# Patient Record
Sex: FEMALE | Race: WHITE | NOT HISPANIC OR LATINO | Employment: UNEMPLOYED | URBAN - METROPOLITAN AREA
[De-identification: names, ages, dates, MRNs, and addresses within clinical notes are randomized per-mention and may not be internally consistent; named-entity substitution may affect disease eponyms.]

---

## 2017-04-03 ENCOUNTER — LAB (OUTPATIENT)
Dept: LAB | Facility: HOSPITAL | Age: 37
End: 2017-04-03

## 2017-04-03 ENCOUNTER — CLINICAL SUPPORT (OUTPATIENT)
Dept: GENETICS | Facility: HOSPITAL | Age: 37
End: 2017-04-03

## 2017-04-03 DIAGNOSIS — Z80.41 FAMILY HISTORY OF OVARIAN CANCER: ICD-10-CM

## 2017-04-03 DIAGNOSIS — Z80.3 FAMILY HISTORY OF BREAST CANCER: Primary | ICD-10-CM

## 2017-04-03 PROCEDURE — 96040: CPT | Performed by: GENETIC COUNSELOR, MS

## 2017-04-04 NOTE — PROGRESS NOTES
Zakiya Borrego is a 36-year old female who was seen for genetic counseling due to a family history of breast and ovarian cancer and a BRCA1 mutation identified in her sister.  Ms. Borrego has a history of melanoma at age 18.  Ms. Borrego has already had negative single site testing for the BRCA1 mutation identified in her sister. Ms. Borrego was interested in discussing her risk for other hereditary cancer syndromes and appropriate screening recommendations, and decided to pursue genetic testing.   Ms. Borrego opted to pursue comprehensive testing via the CancerNext panel ordered through [employer] which includes 34 genes that are known to increase the risk of cancer.  Typically, only single site testing for the known mutation is recommended for unaffected individuals in a family once a mutation has been identified, but in this case no affected relatives have been tested.  Therefore, due to the possibility of additional genetic risk factors that could have contributed to the family history, comprehensive testing was ordered.      PERTINENT FAMILY HISTORY: (See attached pedigree)   Pat. Aunt:  Breast cancer, 39  Pat. Cousin:  Breast cancer, late 30s  Pat. Grandmother: Breast cancer, 36     Ovarian cancer, 45    No affected relatives have had genetic testing.  We do not have medical records regarding Ms. Borrego’s family members’ diagnoses.  A copy of her sister’s genetic testing was available for review.    RISK ASSESSMENT:  Ms. Borrego’s family history of cancer raises the question of a hereditary cancer syndrome. Her sister, who has not had a cancer diagnosis, has been identified to have a BRCA1 mutation.   Ms. Borrego has already had negative testing for that specific mutation.  In this particular family, no affected relatives have had genetic testing, and in some cases there may be more than one genetic mutation present in a family that is contributing to the cancer risk.  We discussed full BRCA1/2 testing  as well as multigene panel testing based on the family history of breast and ovarian cancer.  This risk assessment is based on the family history information provided at the time of the appointment.  The assessment could change in the future should new information be obtained.    GENETIC COUNSELING (40 minutes): We reviewed the family history information in detail.  Cases of cancer follow three general patterns: sporadic, familial, and hereditary.  While most cancer is sporadic, some cases appear to occur in family clusters.  These cases are said to be familial and account for 10-20% of breast cancer   cases.  Familial cases may be due to a combination of shared genes and environmental factors among family members.  In even fewer families, the cancer is said to be inherited, and the genes responsible for the cancer are known.      Family histories typical of hereditary cancer syndromes usually include multiple first- and second-degree relatives diagnosed with cancer types that define a syndrome.  These cases tend to be diagnosed at younger-than-expected ages and can be bilateral or multifocal.  The cancer in these families follows an autosomal dominant inheritance pattern, which indicates the likely presence of a mutation in a cancer susceptibility gene.  Children and siblings of an individual believed to carry this mutation have a 50% chance of inheriting that mutation, thereby inheriting the increased risk to develop cancer.  These mutations can be passed down from the maternal or the paternal lineage.    Based on Ms. Borrego’s family history of breast and ovarian cancer, we discussed hereditary breast and ovarian cancer syndrome.  A significant proportion of hereditary breast and ovarian cancer can be attributed to mutations in the BRCA1 and BRCA2 genes.  Mutations in these genes confer an increased risk for breast cancer, ovarian cancer, male breast cancer, prostate cancer, and pancreatic cancer.  Women with a  BRCA1 or BRCA2 mutation have up to an 87% lifetime risk of breast cancer and up to a 44% risk of ovarian cancer.      There are other, more rare, hereditary cancer syndromes. Some of these conditions have well defined cancer risks and established management guidelines.  Other genes that can be tested for have been more recently described, and there may be less data regarding the risks and therefore may not have established management guidelines.  We discussed these limitations at length.  Based on Ms. Borrego’s family history and her desire to get the most information regarding her personal risks and potential risks for family members, she opted to pursue testing through a panel evaluating several other genes known to increase the risk for cancer.    Genetic Testing:  The risks, benefits and limitations of genetic testing and implications for clinical management following testing were reviewed.  DNA test results can influence decisions regarding screening, prevention and surgical management.  Genetic testing can have significant psychological implications for both individuals and families.  Also discussed was the possibility of employment and insurance discrimination based on genetic test results and the laws in place to prevent this.    We discussed panel testing, which would involve testing for BRCA1/2 and 32 additional cancer susceptibility genes included on the CancerNext panel simultaneously.  The benefits and limitations of genetic testing were discussed and Ms. Borrego decided to pursue testing via the panel. The implications of a positive or negative test result were discussed. We discussed the possibility that, in some cases, genetic test results may be informative or may be ambiguous due to the identification of a genetic variant. These variants may or may not be associated with an increased cancer risk.  Given her family history, a negative test result may not eliminate all cancer risk, although the risk  would not be as high as it would with positive genetic testing.      PLAN: Ms. Borrego will be contacted by telephone to discuss the results, which should be available in 2-3 weeks. In the meantime Ms. Borrego is welcome to contact us at [phone] with any questions she may have.      Elizabeth Sanchez MS, [facility] [place]       Certified Genetic Counselor

## 2017-04-24 ENCOUNTER — DOCUMENTATION (OUTPATIENT)
Dept: GENETICS | Facility: HOSPITAL | Age: 37
End: 2017-04-24

## 2017-04-24 NOTE — PROGRESS NOTES
Zakiya Borrego is a 36-year old female who was seen for genetic counseling due to a family history of breast and ovarian cancer and a BRCA1 mutation identified in her sister.  Ms. Borrego has a history of melanoma at age 18.  Ms. Borrego has already had negative single site testing for the BRCA1 mutation identified in her sister. Ms. Borrego was interested in discussing her risk for other hereditary cancer syndromes and appropriate screening recommendations, and decided to pursue genetic testing.   Ms. Borrego opted to pursue comprehensive testing via the CancerNext panel ordered through [employer] which includes 34 genes that are known to increase the risk of cancer.  Typically, only single site testing for the known mutation is recommended for unaffected individuals in a family once a mutation has been identified, but in this case no affected relatives have been tested.  Therefore, due to the possibility of additional genetic risk factors that could have contributed to the family history, comprehensive testing was ordered.  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  No mutations were identified in any of the genes known to be associated with hereditary breast cancer.  These results were discussed with the patient by telephone on 4/24/17.    PERTINENT FAMILY HISTORY: (See attached pedigree)   Pat. Aunt:  Breast cancer, 39  Pat. Cousin:  Breast cancer, late 30s  Pat. Grandmother: Breast cancer, 36     Ovarian cancer, 45    No affected relatives have had genetic testing.  We do not have medical records regarding Ms. Borrego’s family members’ diagnoses.  A copy of her sister’s genetic testing was available for review.    RISK ASSESSMENT:  Ms. Borrego’s family history of cancer raises the question of a hereditary cancer syndrome. Her sister, who has not had a cancer diagnosis, has been identified to  have a BRCA1 mutation.   Ms. Borrego has already had negative testing for that specific mutation.  In this particular family, no affected relatives have had genetic testing, and in some cases there may be more than one genetic mutation present in a family that is contributing to the cancer risk.  We discussed full BRCA1/2 testing as well as multigene panel testing based on the family history of breast and ovarian cancer.     The identified mutation in HOXB13 is not known to impact breast cancer risk.  Therefore, Ms. Borrego’s breast cancer risk for breast cancer should be assessed based on the family history, taking into account that her sister has had a BRCA1 mutation identified that Ms. Borrego has tested negative for.  Utilizing the Tyrer-Cuzick/SHIRA model, Ms. Borrego’s lifetime breast cancer risk was estimated to be 24.1%, which is elevated above the general population risk for breast cancer of 12.5%.   In general, a risk greater than 20% warrants increased screening.  This risk assessment is based on the family history information provided at the time of the appointment.  The assessment could change in the future should new information be obtained.    GENETIC COUNSELING (40 minutes): We reviewed the family history information in detail.  Cases of cancer follow three general patterns: sporadic, familial, and hereditary.  While most cancer is sporadic, some cases appear to occur in family clusters.  These cases are said to be familial and account for 10-20% of breast cancer cases.  Familial cases may be due to a combination of shared genes and environmental factors among family members.  In even fewer families, the cancer is said to be inherited, and the genes responsible for the cancer are known.      Family histories typical of hereditary cancer syndromes usually include multiple first- and second-degree relatives diagnosed with cancer types that define a syndrome.  These cases tend to be diagnosed at  younger-than-expected ages and can be bilateral or multifocal.  The cancer in these families follows an autosomal dominant inheritance pattern, which indicates the likely presence of a mutation in a cancer susceptibility gene.  Children and siblings of an individual believed to carry this mutation have a 50% chance of inheriting that mutation, thereby inheriting the increased risk to develop cancer.  These mutations can be passed down from the maternal or the paternal lineage.    Based on Ms. Borrego’s family history of breast and ovarian cancer, we discussed hereditary breast and ovarian cancer syndrome.  A significant proportion of hereditary breast and ovarian cancer can be attributed to mutations in the BRCA1 and BRCA2 genes.  Mutations in these genes confer an increased risk for breast cancer, ovarian cancer, male breast cancer, prostate cancer, and pancreatic cancer.  Women with a BRCA1 or BRCA2 mutation have up to an 87% lifetime risk of breast cancer and up to a 44% risk of ovarian cancer.      There are other, more rare, hereditary cancer syndromes. Some of these conditions have well defined cancer risks and established management guidelines.  Other genes that can be tested for have been more recently described, and there may be less data regarding the risks and therefore may not have established management guidelines.  We discussed these limitations at length.  Based on Ms. Borrego’s family history and her desire to get the most information regarding her personal risks and potential risks for family members, she opted to pursue testing through a panel evaluating several other genes known to increase the risk for cancer.    Genetic Testing:  The risks, benefits and limitations of genetic testing and implications for clinical management following testing were reviewed.  DNA test results can influence decisions regarding screening, prevention and surgical management.  Genetic testing can have significant  psychological implications for both individuals and families.  Also discussed was the possibility of employment and insurance discrimination based on genetic test results and the laws in place to prevent this.    We discussed panel testing, which would involve testing for BRCA1/2 and 32 additional cancer susceptibility genes included on the CancerNext panel simultaneously.  The benefits and limitations of genetic testing were discussed and Ms. Borrego decided to pursue testing via the panel. The implications of a positive or negative test result were discussed. We discussed the possibility that, in some cases, genetic test results may be informative or may be ambiguous due to the identification of a genetic variant. These variants may or may not be associated with an increased cancer risk.  Given her family history, a negative test result may not eliminate all cancer risk, although the risk would not be as high as it would with positive genetic testing.      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  This gene has been specifically associated with an increased risk of prostate cancer and therefore this result may impact risks and management for Ms. Borrego’s male relatives.  At this time, HOXB13 has not been associated with cancer risks that would impact Ms. Borrego or other female relatives.  However, this is a gene that has been recently added to comprehensive multigene panels, and it is possible that with time additional risk information may become available that would be important to Ms. Borrego.  She is encouraged to contact us annually to learn if additional information has become available regarding the HOXB13 gene.  Relatives could now opt to have single site testing for this identified HOXB13 mutation to determine if they have inherited this prostate cancer risk factor.  If a male is found to test positive, earlier prostate screening would be appropriate.      Cancer Prevention:  Despite the negative BRCA1/2 and additional breast cancer susceptibility gene testing, Ms. Borrego is still at a somewhat increased lifetime risk for breast cancer.  Accounting for the fact that she has tested negative for the BRCA1 mutation identified in her sister, Ms. Borrego’s lifetime risk was still estimate to be up to 24.1%.  Given the increased risk, options available to individuals with a high lifetime risk for breast cancer were discussed.      Surveillance for individuals with a high lifetime risk of breast cancer (>20% lifetime risk), based on NCCN guidelines, would consist of semi-annual clinical breast exams and monthly self-breast exams starting by age 18 and annual mammography starting 10 years younger than the earliest diagnosis in the family.  According to an American Cancer Society expert panel, annual breast MRI should be offered to women whose lifetime risk of breast cancer is 20-25 percent or more.  Female relatives could have individualized risk assessments performed to quantify their breast cancer risk.  Breast cancer chemoprevention is another option that female relatives may wish consider.  Studies have shown that Tamoxifen and Raloxifene can cut the risk of estrogen receptor positive breast cancer by 50% when taken by high-risk women over a 5-year period.  There are risks and side effects associated with these medications; therefore the risks versus benefits must be considered prior to deciding to take chemopreventative medications.       PLAN:  Ms. Borrego would like to be followed by the [facility] at [facility] to coordinate increased screening based on her increased breast cancer risk.  The clinic coordinator will contact Ms. Borrego for an appointment.  Ms. Borrego is welcome to contact us at [phone] with any questions she may have.      Elizabeth Sanchez MS, [facility] [place]       Certified Genetic Counselor          Cc: Cesar MORILLO  MD Zakiya Aceves

## 2017-07-26 ENCOUNTER — OFFICE VISIT (OUTPATIENT)
Dept: GYNECOLOGIC ONCOLOGY | Facility: CLINIC | Age: 37
End: 2017-07-26

## 2017-07-26 VITALS
BODY MASS INDEX: 20.83 KG/M2 | TEMPERATURE: 98.4 F | HEART RATE: 75 BPM | WEIGHT: 125 LBS | SYSTOLIC BLOOD PRESSURE: 114 MMHG | HEIGHT: 65 IN | RESPIRATION RATE: 16 BRPM | DIASTOLIC BLOOD PRESSURE: 66 MMHG | OXYGEN SATURATION: 97 %

## 2017-07-26 DIAGNOSIS — Z12.31 ENCOUNTER FOR MAMMOGRAM TO ESTABLISH BASELINE MAMMOGRAM: ICD-10-CM

## 2017-07-26 DIAGNOSIS — Z84.81 FAMILY HISTORY OF BRCA1 GENE POSITIVE: Primary | ICD-10-CM

## 2017-07-26 DIAGNOSIS — Z80.3 FAMILY HISTORY OF BREAST CANCER: ICD-10-CM

## 2017-07-26 PROCEDURE — 99214 OFFICE O/P EST MOD 30 MIN: CPT | Performed by: NURSE PRACTITIONER

## 2017-07-26 RX ORDER — CHLORAL HYDRATE 500 MG
CAPSULE ORAL DAILY
COMMUNITY

## 2017-07-26 RX ORDER — LEVOTHYROXINE SODIUM 0.03 MG/1
TABLET ORAL
Refills: 0 | COMMUNITY
Start: 2017-07-09

## 2017-07-26 RX ORDER — BIOTIN 10 MG
TABLET ORAL DAILY
COMMUNITY

## 2017-07-26 NOTE — PROGRESS NOTES
GYN ONCOLOGY HIGH RISK CLINIC    Zakiya Borrego  [id number]  1980    Chief Complaint:   Chief Complaint   Patient presents with   • [facility]     high risk clinic       HISTORY OF PRESENT ILLNESS:    Zakiya Borrego is a 36 y.o. year old female here today for her high risk visit. She has a strong family history of breast and ovarian cancer and a sister with history of BRCA 1 positivity.  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  She has an estimated lifetime risk of developing breast cancer around 24.1%.  She is interested in pursuing active cancer surveillance.  Her current screening regimen includes yearly clinical breast exams and monthly self breast exams.  She has never had a mammogram or screening breast MRI.  She does not participate in routine ovarian screening, but is concerned as she had a paternal grandmother and paternal aunt who  in their 40s secondary to ovarian cancer.  She has a history of hormone replacement therapy used only during failed attempts at in vitro fertilization.  She is not on any current oral contraceptives or hormonal management.  She has a history of malignant melanoma to the right chest wall.  This was diagnosed at age 18.  She has yearly skin exams with no evidence of recurrent disease to date.  She is also followed by Dr. mittal for history of thyroid nodules with benign biopsy and management of Synthroid dosing.  She complains today of mild breast tenderness, worse around the time of her menstrual cycles.  She has noted no new breast masses or lesions, however she has cystic breast tissue that makes self breast exams difficulty.  She also has history of endometriosis with associated infertility.    Past Medical History:   Diagnosis Date   • Endometriosis        Past Surgical History:   Procedure Laterality Date   • HYSTEROSCOPY  2010 2014    X2   • SKIN CANCER  "EXCISION  1998    under breast    • TONSILLECTOMY  2005       Family History   Problem Relation Age of Onset   • BRCA 1/2 Sister      1   • Lung cancer Maternal Grandmother    • Brain cancer Maternal Grandfather 63   • Breast cancer Paternal Grandmother 38   • Ovarian cancer Paternal Grandmother 45   • Breast cancer Paternal Aunt 39       Health Maintenance:    Regular self breast exam: yes  Mammogram: never. History of abnormal mammogram: no   GYN care with Dr. Aceves done 12/2016    Risk Asessment: Emanuel: 24.1%, genetic testing revealed HOXB13 gene showing increased risk for prostate cancer, BRCA testing negative.     Allergies:   Allergies   Allergen Reactions   • Morphine Hives     Dropped BP       Medications: Medications have been reviewed in the chart and reconciled.     Review of Systems   Constitutional: Negative for appetite change, chills, fatigue, fever and unexpected weight change.   Respiratory: Negative for cough, shortness of breath and wheezing.    Cardiovascular: Negative for chest pain, palpitations and leg swelling.   Gastrointestinal: Negative for abdominal distention, abdominal pain, blood in stool, constipation, diarrhea, nausea and vomiting.   Endocrine: Negative.    Genitourinary: Negative for dyspareunia, dysuria, frequency, genital sores, hematuria, pelvic pain, urgency, vaginal bleeding, vaginal discharge and vaginal pain.   Musculoskeletal: Negative for arthralgias, gait problem and joint swelling.   Neurological: Negative for dizziness, seizures, syncope, weakness, light-headedness, numbness and headaches.   Hematological: Negative for adenopathy.   Psychiatric/Behavioral: Negative.        Physical Exam  Vital Signs: /66  Pulse 75  Temp 98.4 °F (36.9 °C) (Temporal Artery )   Resp 16  Ht 65\" (165.1 cm)  Wt 125 lb (56.7 kg)  LMP 07/12/2017  SpO2 97%  BMI 20.8 kg/m2   General Appearance:  alert, cooperative, no apparent distress and appears stated age "   Neurologic/Psychiatric: A&O x 3, gait steady, appropriate affect   HEENT:  Normocephalic, without obvious abnormality, mucous membranes moist   Neck: Supple, symmetrical, trachea midline, no adenopathy;  No thyromegaly, masses, or tenderness   Back:   Symmetric, no curvature, ROM normal, no CVA tenderness   Lungs:   Clear to auscultation bilaterally; respirations regular, even, and unlabored bilaterally   Heart:  Regular rate and rhythm, no murmurs appreciated   Breasts:  Symmetrical, no masses, no nipple discharge, fibrocystic changes bilaterally with no obvious masses and scar to right breast consistent with prior melanoma excision   Abdomen:   Soft, non-tender, non-distended and no organomegaly   Lymph nodes: No cervical, supraclavicular, inguinal or axillary adenopathy noted   Extremities: Normal, atraumatic; no clubbing, cyanosis, or edema    Skin: No rashes, ulcers, or suspicious lesions noted   Pelvic: deferred       Assessment and Plan:    Zakiya was seen today for establish care.    Diagnoses and all orders for this visit:    Family history of BRCA1 gene positive  -     Mammo Screening Digital Tomosynthesis Bilateral With CAD; Future    Family history of breast cancer  -     Mammo Screening Digital Tomosynthesis Bilateral With CAD; Future    Encounter for mammogram to establish baseline mammogram  -     Mammo Screening Digital Tomosynthesis Bilateral With CAD; Future    Continue monthly self breast exams, report any concerning findings.  We'll schedule the patient to have baseline screening mammogram.  This will be done within the next month to [facility].  Patient interested in pursuing MRI for screening.  We will set this up in 6 months time.  We discussed the need for this to be scheduled based on her menstrual cycles and she was given a card as a reminder to call house on the day she starts her cycle in 6 months.  I recommended the patient consider trying vitamin E supplement 400 units  daily for breast tenderness.  I recommended she add vitamin D 1000 units daily for breast and bone health.  She will continue routine GYN care with Dr. Aceves, due December 2017.  Although the patient does not have an identified BRCA mutation, we did discuss the option of yearly transvaginal ultrasounds for ovarian screening with her GYN care.  We also discussed the option of enrolling in 's ovarian cancer screening program should she desire based on her family history of ovarian cancer in her paternal grandmother as well as paternal aunt.  The patient will continue follow-up with dermatology for yearly screening exam secondary to history of malignant melanoma.  She will leak screening colonoscopy at age 51, sooner if symptoms warrant earlier examination.    Patient Education:  Discussed the purpose of high risk clinic in coordinating, scheduling, and planning screening interventions to include yearly mammograms, yearly screening breast MRI, Q6 month CBE, and monthly SBE. Colonoscopy and GYN screening per national guidelines.   Reviewed technique for SBE including concerning findings to report  Discussed tamoxifen/evista use for chemoprevention including risk and benefits of use as well as side effects of medication-pt declines at this time  Discussed screening breast MRI scheduling based on menstrual cycles. We also discussed the increased sensitivity of MRI screening and possibility of call- backs for additional imaging or biopsies to establish baseline.   Discussed vitamin D as benefit to bone health and possible benefit to breast health. Recommended 1000 IU daily unless contraindicated  Discussed prophylactic surgeries including bilateral mastectomies and bilateral salpingo-oophrectomy. We discussed potential for risk reduction as well as risks of surgery. The patient declines surgical intervention at this time,  but the options were presented to her as education and she knows she can request further  information or referral for consulation if she desires in the future.   Discussed benefit of Vitamin E for fibrocystic breasts/breast pain    Total Time Spent: 30 minutes, 25 minutes spent in counseling    Return in about 1 year (around 7/26/2018) for Annual Exam.      Jaci Cartagena, APRN        Note: Speech recognition transcription software was used to dictate portions of this document.  An attempt at proofreading has been made though minor errors in transcription may still be present.  Please do not hesitate to call our office with any questions.

## 2017-08-04 ENCOUNTER — HOSPITAL ENCOUNTER (OUTPATIENT)
Dept: MAMMOGRAPHY | Facility: HOSPITAL | Age: 37
Discharge: HOME | End: 2017-08-04
Admitting: NURSE PRACTITIONER

## 2017-08-04 DIAGNOSIS — Z12.31 ENCOUNTER FOR MAMMOGRAM TO ESTABLISH BASELINE MAMMOGRAM: ICD-10-CM

## 2017-08-04 DIAGNOSIS — Z84.81 FAMILY HISTORY OF BRCA1 GENE POSITIVE: ICD-10-CM

## 2017-08-04 DIAGNOSIS — Z80.3 FAMILY HISTORY OF BREAST CANCER: ICD-10-CM

## 2017-08-04 PROCEDURE — 77063 BREAST TOMOSYNTHESIS BI: CPT

## 2017-08-04 PROCEDURE — G0202 SCR MAMMO BI INCL CAD: HCPCS

## 2017-08-04 PROCEDURE — 77067 SCR MAMMO BI INCL CAD: CPT | Performed by: RADIOLOGY

## 2017-08-04 PROCEDURE — 77063 BREAST TOMOSYNTHESIS BI: CPT | Performed by: RADIOLOGY

## 2017-08-25 ENCOUNTER — HOSPITAL ENCOUNTER (OUTPATIENT)
Dept: ULTRASOUND IMAGING | Facility: HOSPITAL | Age: 37
Discharge: HOME | End: 2017-08-25

## 2017-08-25 ENCOUNTER — HOSPITAL ENCOUNTER (OUTPATIENT)
Dept: MAMMOGRAPHY | Facility: HOSPITAL | Age: 37
Discharge: HOME | End: 2017-08-25
Admitting: NURSE PRACTITIONER

## 2017-08-25 DIAGNOSIS — R92.8 ABNORMAL MAMMOGRAM: ICD-10-CM

## 2017-08-25 PROCEDURE — 77062 BREAST TOMOSYNTHESIS BI: CPT | Performed by: RADIOLOGY

## 2017-08-25 PROCEDURE — 77066 DX MAMMO INCL CAD BI: CPT | Performed by: RADIOLOGY

## 2017-08-25 PROCEDURE — 76642 ULTRASOUND BREAST LIMITED: CPT | Performed by: RADIOLOGY

## 2017-08-25 PROCEDURE — G0204 DX MAMMO INCL CAD BI: HCPCS

## 2017-08-25 PROCEDURE — G0279 TOMOSYNTHESIS, MAMMO: HCPCS

## 2017-08-25 PROCEDURE — 76642 ULTRASOUND BREAST LIMITED: CPT

## 2017-09-06 DIAGNOSIS — Z91.89 AT HIGH RISK FOR BREAST CANCER: ICD-10-CM

## 2017-09-06 DIAGNOSIS — Z80.3 FAMILY HISTORY OF BREAST CANCER: Primary | ICD-10-CM

## 2017-09-18 ENCOUNTER — HOSPITAL ENCOUNTER (OUTPATIENT)
Dept: MRI IMAGING | Facility: HOSPITAL | Age: 37
Discharge: HOME | End: 2017-09-18
Admitting: NURSE PRACTITIONER

## 2017-09-18 DIAGNOSIS — Z91.89 AT HIGH RISK FOR BREAST CANCER: ICD-10-CM

## 2017-09-18 DIAGNOSIS — Z80.3 FAMILY HISTORY OF BREAST CANCER: ICD-10-CM

## 2017-09-18 PROCEDURE — 0 GADOBENATE DIMEGLUMINE 529 MG/ML SOLUTION: Performed by: NURSE PRACTITIONER

## 2017-09-18 PROCEDURE — 0159T PR BREAST MRI, COMPUTER AIDED DETECTION: CPT | Performed by: RADIOLOGY

## 2017-09-18 PROCEDURE — 77059 MRI BREAST BILATERAL SCREENING W WO CONTRAST: CPT | Performed by: RADIOLOGY

## 2017-09-18 PROCEDURE — A9577 INJ MULTIHANCE: HCPCS | Performed by: NURSE PRACTITIONER

## 2017-09-18 PROCEDURE — C8908 MRI W/O FOL W/CONT, BREAST,: HCPCS

## 2017-09-18 RX ADMIN — GADOBENATE DIMEGLUMINE 12 ML: 529 INJECTION, SOLUTION INTRAVENOUS at 11:45

## 2017-09-19 ENCOUNTER — TELEPHONE (OUTPATIENT)
Dept: MRI IMAGING | Facility: HOSPITAL | Age: 37
End: 2017-09-19

## 2017-09-19 NOTE — TELEPHONE ENCOUNTER
Called patient with MRI Breast results. Recommended a 6 month F/U MRI and Mammogram. Pt wants to be scheduled for both on the same day. Pt verbalized understanding and we will see her in March 2018. Pt verbalized understanding.

## 2018-03-15 ENCOUNTER — HOSPITAL ENCOUNTER (OUTPATIENT)
Dept: MAMMOGRAPHY | Facility: HOSPITAL | Age: 38
Discharge: HOME | End: 2018-03-15
Admitting: NURSE PRACTITIONER

## 2018-03-15 ENCOUNTER — TRANSCRIBE ORDERS (OUTPATIENT)
Dept: MAMMOGRAPHY | Facility: HOSPITAL | Age: 38
End: 2018-03-15

## 2018-03-15 ENCOUNTER — HOSPITAL ENCOUNTER (OUTPATIENT)
Dept: MRI IMAGING | Facility: HOSPITAL | Age: 38
Discharge: HOME | End: 2018-03-15

## 2018-03-15 DIAGNOSIS — R92.8 ABNORMAL FINDINGS ON DIAGNOSTIC IMAGING OF BREAST: ICD-10-CM

## 2018-03-15 DIAGNOSIS — R92.8 ABNORMAL MAMMOGRAM: Primary | ICD-10-CM

## 2018-03-15 PROCEDURE — C8908 MRI W/O FOL W/CONT, BREAST,: HCPCS

## 2018-03-15 PROCEDURE — 77066 DX MAMMO INCL CAD BI: CPT | Performed by: RADIOLOGY

## 2018-03-15 PROCEDURE — 77066 DX MAMMO INCL CAD BI: CPT

## 2018-03-15 PROCEDURE — G0279 TOMOSYNTHESIS, MAMMO: HCPCS

## 2018-03-15 PROCEDURE — A9577 INJ MULTIHANCE: HCPCS | Performed by: NURSE PRACTITIONER

## 2018-03-15 PROCEDURE — 0159T PR BREAST MRI, COMPUTER AIDED DETECTION: CPT | Performed by: RADIOLOGY

## 2018-03-15 PROCEDURE — 77062 BREAST TOMOSYNTHESIS BI: CPT | Performed by: RADIOLOGY

## 2018-03-15 PROCEDURE — 0 GADOBENATE DIMEGLUMINE 529 MG/ML SOLUTION: Performed by: NURSE PRACTITIONER

## 2018-03-15 PROCEDURE — 77059 MRI BREAST BILATERAL DIAGNOSTIC W WO CONTRAST: CPT | Performed by: RADIOLOGY

## 2018-03-15 RX ADMIN — GADOBENATE DIMEGLUMINE 11 ML: 529 INJECTION, SOLUTION INTRAVENOUS at 16:30

## 2018-03-16 ENCOUNTER — TELEPHONE (OUTPATIENT)
Dept: MRI IMAGING | Facility: HOSPITAL | Age: 38
End: 2018-03-16

## 2018-09-17 ENCOUNTER — HOSPITAL ENCOUNTER (OUTPATIENT)
Dept: ULTRASOUND IMAGING | Facility: HOSPITAL | Age: 38
Discharge: HOME | End: 2018-09-17

## 2018-09-17 ENCOUNTER — HOSPITAL ENCOUNTER (OUTPATIENT)
Dept: MAMMOGRAPHY | Facility: HOSPITAL | Age: 38
Discharge: HOME | End: 2018-09-17
Admitting: NURSE PRACTITIONER

## 2018-09-17 DIAGNOSIS — R92.8 ABNORMAL MAMMOGRAM: ICD-10-CM

## 2018-09-17 PROCEDURE — 76642 ULTRASOUND BREAST LIMITED: CPT | Performed by: RADIOLOGY

## 2018-09-17 PROCEDURE — 77066 DX MAMMO INCL CAD BI: CPT | Performed by: RADIOLOGY

## 2018-09-17 PROCEDURE — 77066 DX MAMMO INCL CAD BI: CPT

## 2018-09-17 PROCEDURE — G0279 TOMOSYNTHESIS, MAMMO: HCPCS

## 2018-09-17 PROCEDURE — 77062 BREAST TOMOSYNTHESIS BI: CPT | Performed by: RADIOLOGY

## 2018-09-17 PROCEDURE — 76642 ULTRASOUND BREAST LIMITED: CPT

## 2019-08-20 ENCOUNTER — TRANSCRIBE ORDERS (OUTPATIENT)
Dept: ADMINISTRATIVE | Facility: HOSPITAL | Age: 39
End: 2019-08-20

## 2019-08-20 DIAGNOSIS — Z12.31 VISIT FOR SCREENING MAMMOGRAM: Primary | ICD-10-CM

## 2019-10-11 ENCOUNTER — APPOINTMENT (OUTPATIENT)
Dept: MAMMOGRAPHY | Facility: HOSPITAL | Age: 39
End: 2019-10-11

## 2019-10-11 DIAGNOSIS — Z80.3 FAMILY HISTORY OF BREAST CANCER: ICD-10-CM

## 2019-10-11 DIAGNOSIS — Z84.81 FAMILY HISTORY OF BRCA1 GENE POSITIVE: Primary | ICD-10-CM

## 2019-10-11 DIAGNOSIS — Z12.39 BREAST CANCER SCREENING, HIGH RISK PATIENT: ICD-10-CM

## 2019-12-11 ENCOUNTER — APPOINTMENT (OUTPATIENT)
Dept: MAMMOGRAPHY | Facility: HOSPITAL | Age: 39
End: 2019-12-11

## 2019-12-13 ENCOUNTER — HOSPITAL ENCOUNTER (OUTPATIENT)
Dept: MAMMOGRAPHY | Facility: HOSPITAL | Age: 39
Discharge: HOME | End: 2019-12-13

## 2019-12-13 ENCOUNTER — OFFICE VISIT (OUTPATIENT)
Dept: GYNECOLOGIC ONCOLOGY | Facility: CLINIC | Age: 39
End: 2019-12-13

## 2019-12-13 VITALS
RESPIRATION RATE: 10 BRPM | BODY MASS INDEX: 22.47 KG/M2 | SYSTOLIC BLOOD PRESSURE: 120 MMHG | DIASTOLIC BLOOD PRESSURE: 58 MMHG | HEART RATE: 71 BPM | WEIGHT: 135 LBS

## 2019-12-13 DIAGNOSIS — Z12.31 VISIT FOR SCREENING MAMMOGRAM: ICD-10-CM

## 2019-12-13 DIAGNOSIS — Z80.3 FAMILY HISTORY OF BREAST CANCER: ICD-10-CM

## 2019-12-13 DIAGNOSIS — Z91.89 AT HIGH RISK FOR BREAST CANCER: Primary | ICD-10-CM

## 2019-12-13 DIAGNOSIS — Z84.81 FAMILY HISTORY OF BRCA1 GENE POSITIVE: ICD-10-CM

## 2019-12-13 PROCEDURE — 99214 OFFICE O/P EST MOD 30 MIN: CPT | Performed by: NURSE PRACTITIONER

## 2019-12-13 NOTE — PROGRESS NOTES
GYN ONCOLOGY HIGH RISK CLINIC    Zakiya Borrego  [id number]  1980    Chief Complaint:   Chief Complaint   Patient presents with   • Follow-up     no complaints       HISTORY OF PRESENT ILLNESS:    Zakiya Borrego is a 39 y.o. year old female here today for her high risk visit. She was last seen in high risk clinic by BEL Cano 7/26/2017. She is here to resume high risk care. She has a strong family history of breast and ovarian cancer and a sister with history of BRCA 1 positivity.  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  She has an estimated lifetime risk of developing breast cancer around 24.1%. She is not on any current oral contraceptives or hormonal management.  She has a history of malignant melanoma to the right chest wall.  This was diagnosed at age 18.  She has yearly skin exams with no evidence of recurrent disease to date.     Patient was seen by Dr. Aceves, her local gynecologist, in November 2019 for routine annual exam with Pap smear.  Exam negative at that time.  She is scheduled for mammogram today, last mammogram in 2018.  She does not perform regular home self breast exams due to significant fibrocystic breast changes laterally making it difficult to   differentiate masses from cysts.  Denies nipple discharge, skin changes, or obvious breast changes.      Past Medical History:   Diagnosis Date   • Endometriosis    • Melanoma (CMS/HCC) 1999       Past Surgical History:   Procedure Laterality Date   • HYSTEROSCOPY  2010 2014    X2   • SKIN CANCER EXCISION  1998    under breast    • TONSILLECTOMY  2005       Family History   Problem Relation Age of Onset   • BRCA 1/2 Sister         1   • Lung cancer Maternal Grandmother    • Brain cancer Maternal Grandfather 63   • Breast cancer Paternal Grandmother 38   • Ovarian cancer Paternal Grandmother 45   • Breast cancer Paternal  Aunt 39       Health Maintenance:    Regular self breast exam: no  Mammogram: 2018. History of abnormal mammogram: yes - follow-ups stable  Breast MRI: 3/15/2018. Results: negative    Risk Asessment: Emanuel: 24.1%, genetic testing revealed HOXB13 gene showing increased risk for prostate cancer, BRCA testing negative.     Allergies:   Allergies   Allergen Reactions   • Morphine Hives     Dropped BP       Medications: Medications have been reviewed in the chart and reconciled.     Review of Systems   Constitutional: Negative for appetite change, chills, fatigue, fever and unexpected weight change.   Respiratory: Negative for cough, shortness of breath and wheezing.    Cardiovascular: Negative for chest pain, palpitations and leg swelling.   Gastrointestinal: Negative for abdominal distention, abdominal pain, blood in stool, constipation, diarrhea, nausea and vomiting.   Endocrine: Negative.    Genitourinary: Negative for dyspareunia, dysuria, frequency, genital sores, hematuria, pelvic pain, urgency, vaginal bleeding, vaginal discharge and vaginal pain.   Musculoskeletal: Negative for arthralgias, gait problem and joint swelling.   Neurological: Negative for dizziness, seizures, syncope, weakness, light-headedness, numbness and headaches.   Hematological: Negative for adenopathy.   Psychiatric/Behavioral: Negative.        Physical Exam  Vital Signs: /58   Pulse 71   Resp 10   Wt 61.2 kg (135 lb)   BMI 22.47 kg/m²   Pain Score    12/13/19 1053   PainSc: 0-No pain      General Appearance:  alert, cooperative, no apparent distress, appears stated age and normal weight   Neurologic/Psychiatric: A&O x 3, gait steady, appropriate affect   HEENT:  Normocephalic, without obvious abnormality, mucous membranes moist   Lungs:   Clear to auscultation bilaterally; respirations regular, even, and unlabored bilaterally   Heart:  Regular rate and rhythm, no murmurs appreciated   Breasts:  Symmetrical, no masses, no lesions,  no nipple discharge and fibrocystic changes bilaterally with no obvious masses   Abdomen:   Soft, non-tender, non-distended and no organomegaly   Lymph nodes: No axillary adenopathy noted   Extremities: Normal, atraumatic; no clubbing, cyanosis, or edema    Skin: No rashes, ulcers, or suspicious lesions noted   Pelvic: deferred       Assessment and Plan:    Zakiya was seen today for follow-up.    Diagnoses and all orders for this visit:    At high risk for breast cancer  -     MRI Breast Bilateral Screening With & Without Contrast; Future    Family history of BRCA1 gene positive  -     MRI Breast Bilateral Screening With & Without Contrast; Future    Family history of breast cancer  -     MRI Breast Bilateral Screening With & Without Contrast; Future          Patient Education:  Reviewed screening schedule related to diagnosis as follows:  Monthly breast self-exam starting at age 18.  Clinical breast exam every 6 months.  Annual mammogram starting at age 40, or 10 years prior to the earliest diagnosis in the family.   Annual MRI starting at age 40, or 10 years prior to the earliest diagnosis in the family.   Consider chemoprevention for breast cancer risk reduction (tamoxifen/evista).     Discussed the purpose of high risk clinic in coordinating, scheduling, and planning screening interventions to include yearly mammograms, yearly screening breast MRI, Q6 month CBE, and monthly SBE. Colonoscopy and GYN screening per national guidelines.   Continue routine GYN care with Dr. Aceves, due again November 2020.  Although the patient does not have an identified BRCA mutation, we did discuss the option of yearly transvaginal ultrasounds for ovarian screening with her GYN care.  We also discussed the option of enrolling in 's ovarian cancer screening program should she desire based on her family history of ovarian cancer in her paternal grandmother as well as paternal aunt.  The patient will continue follow-up with  dermatology for routine screening exams secondary to history of malignant melanoma.  She will plan screening colonoscopy at age 51, sooner if symptoms warrant earlier examination.  Reviewed technique for SBE including concerning findings to report.  We discussed returning to more regular high risk care, alternating with her gyn annuals. We will see her back in 6 months, then she will see her gynecologist in winter 2020.   Discussed yearly screening mammogram. Results will be reported by breast imaging center and a copy of report will be sent to our office for review. Mammogram scheduled today.    Discussed screening breast MRI scheduling based on menstrual cycles, HRT. We also discussed the increased sensitivity of MRI screening and possibility of call- backs for additional imaging or biopsies to establish baseline. Ideally, this should be done 6 months from mammogram. Order placed today, to be done in 6/2020.   Discussed vitamin D as benefit to bone health and possible benefit to breast health. Recommended 1000 IU daily unless contraindicated  Discussed benefit of Vitamin E for fibrocystic breasts/breast pain  Discussed tamoxifen/evista use for chemoprevention including risk and benefits of use as well as side effects of medication-pt declines at this time      Return to clinic in 6 months for High Risk visit.      BEL [place]

## 2020-06-16 ENCOUNTER — TELEPHONE (OUTPATIENT)
Dept: GYNECOLOGIC ONCOLOGY | Facility: CLINIC | Age: 40
End: 2020-06-16

## 2023-04-04 NOTE — TELEPHONE ENCOUNTER
Called pt with Breast MRI results. Pt is wanting to start a 6 month rotation with her MMG. Her next MMG is scheduled for 9/2018 so she will be due for her Breast MRI again in March 2019.  Pt expressed verbal understanding and will call with any further questions or concerns.    24